# Patient Record
Sex: MALE | Race: BLACK OR AFRICAN AMERICAN | Employment: UNEMPLOYED | URBAN - METROPOLITAN AREA
[De-identification: names, ages, dates, MRNs, and addresses within clinical notes are randomized per-mention and may not be internally consistent; named-entity substitution may affect disease eponyms.]

---

## 2019-09-03 ENCOUNTER — HOSPITAL ENCOUNTER (INPATIENT)
Facility: HOSPITAL | Age: 39
LOS: 2 days | Discharge: LEFT BEFORE DISCHARGE | DRG: 684 | End: 2019-09-05
Attending: EMERGENCY MEDICINE | Admitting: HOSPITALIST
Payer: MEDICAID

## 2019-09-03 DIAGNOSIS — E86.0 DEHYDRATION: ICD-10-CM

## 2019-09-03 DIAGNOSIS — D64.9 ANEMIA, UNSPECIFIED TYPE: ICD-10-CM

## 2019-09-03 DIAGNOSIS — R55 SYNCOPE, NEAR: ICD-10-CM

## 2019-09-03 DIAGNOSIS — N17.9 ACUTE RENAL FAILURE, UNSPECIFIED ACUTE RENAL FAILURE TYPE (HCC): Primary | ICD-10-CM

## 2019-09-03 LAB
ANION GAP SERPL CALC-SCNC: 12 MMOL/L (ref 0–18)
BASOPHILS # BLD AUTO: 0.02 X10(3) UL (ref 0–0.2)
BASOPHILS NFR BLD AUTO: 0.5 %
BILIRUB UR QL: NEGATIVE
BUN BLD-MCNC: 61 MG/DL (ref 7–18)
BUN/CREAT SERPL: 14.1 (ref 10–20)
CALCIUM BLD-MCNC: 10.1 MG/DL (ref 8.5–10.1)
CHLORIDE SERPL-SCNC: 106 MMOL/L (ref 98–112)
CO2 SERPL-SCNC: 22 MMOL/L (ref 21–32)
COLOR UR: YELLOW
CREAT BLD-MCNC: 4.33 MG/DL (ref 0.7–1.3)
DEPRECATED RDW RBC AUTO: 39.8 FL (ref 35.1–46.3)
EOSINOPHIL # BLD AUTO: 0.06 X10(3) UL (ref 0–0.7)
EOSINOPHIL NFR BLD AUTO: 1.4 %
ERYTHROCYTE [DISTWIDTH] IN BLOOD BY AUTOMATED COUNT: 13.9 % (ref 11–15)
GLUCOSE BLD-MCNC: 114 MG/DL (ref 70–99)
GLUCOSE BLDC GLUCOMTR-MCNC: 63 MG/DL (ref 70–99)
GLUCOSE BLDC GLUCOMTR-MCNC: 88 MG/DL (ref 70–99)
GLUCOSE BLDC GLUCOMTR-MCNC: 88 MG/DL (ref 70–99)
GLUCOSE UR-MCNC: NEGATIVE MG/DL
HCT VFR BLD AUTO: 32.1 % (ref 39–53)
HGB BLD-MCNC: 10.7 G/DL (ref 13–17.5)
HGB UR QL STRIP.AUTO: NEGATIVE
HYALINE CASTS #/AREA URNS AUTO: 6 /LPF
IMM GRANULOCYTES # BLD AUTO: 0.02 X10(3) UL (ref 0–1)
IMM GRANULOCYTES NFR BLD: 0.5 %
KETONES UR-MCNC: NEGATIVE MG/DL
LEUKOCYTE ESTERASE UR QL STRIP.AUTO: NEGATIVE
LYMPHOCYTES # BLD AUTO: 1.02 X10(3) UL (ref 1–4)
LYMPHOCYTES NFR BLD AUTO: 23.4 %
MCH RBC QN AUTO: 26.4 PG (ref 26–34)
MCHC RBC AUTO-ENTMCNC: 33.3 G/DL (ref 31–37)
MCV RBC AUTO: 79.1 FL (ref 80–100)
MONOCYTES # BLD AUTO: 0.24 X10(3) UL (ref 0.1–1)
MONOCYTES NFR BLD AUTO: 5.5 %
NEUTROPHILS # BLD AUTO: 3 X10 (3) UL (ref 1.5–7.7)
NEUTROPHILS # BLD AUTO: 3 X10(3) UL (ref 1.5–7.7)
NEUTROPHILS NFR BLD AUTO: 68.7 %
NITRITE UR QL STRIP.AUTO: NEGATIVE
OSMOLALITY SERPL CALC.SUM OF ELEC: 308 MOSM/KG (ref 275–295)
PH UR: 5 [PH] (ref 5–8)
PLATELET # BLD AUTO: 216 10(3)UL (ref 150–450)
POTASSIUM SERPL-SCNC: 4.7 MMOL/L (ref 3.5–5.1)
PROT UR-MCNC: NEGATIVE MG/DL
RBC # BLD AUTO: 4.06 X10(6)UL (ref 4.3–5.7)
RBC #/AREA URNS AUTO: 2 /HPF
SODIUM SERPL-SCNC: 140 MMOL/L (ref 136–145)
SP GR UR STRIP: 1.02 (ref 1–1.03)
UROBILINOGEN UR STRIP-ACNC: <2
VIT C UR-MCNC: NEGATIVE MG/DL
WBC # BLD AUTO: 4.4 X10(3) UL (ref 4–11)
WBC #/AREA URNS AUTO: 1 /HPF

## 2019-09-03 PROCEDURE — 83036 HEMOGLOBIN GLYCOSYLATED A1C: CPT | Performed by: HOSPITALIST

## 2019-09-03 PROCEDURE — 84300 ASSAY OF URINE SODIUM: CPT | Performed by: HOSPITALIST

## 2019-09-03 PROCEDURE — 85025 COMPLETE CBC W/AUTO DIFF WBC: CPT | Performed by: EMERGENCY MEDICINE

## 2019-09-03 PROCEDURE — 99285 EMERGENCY DEPT VISIT HI MDM: CPT

## 2019-09-03 PROCEDURE — 96361 HYDRATE IV INFUSION ADD-ON: CPT

## 2019-09-03 PROCEDURE — 81003 URINALYSIS AUTO W/O SCOPE: CPT | Performed by: EMERGENCY MEDICINE

## 2019-09-03 PROCEDURE — 96360 HYDRATION IV INFUSION INIT: CPT

## 2019-09-03 PROCEDURE — 80048 BASIC METABOLIC PNL TOTAL CA: CPT | Performed by: EMERGENCY MEDICINE

## 2019-09-03 PROCEDURE — 93005 ELECTROCARDIOGRAM TRACING: CPT

## 2019-09-03 PROCEDURE — 93010 ELECTROCARDIOGRAM REPORT: CPT | Performed by: EMERGENCY MEDICINE

## 2019-09-03 PROCEDURE — 82962 GLUCOSE BLOOD TEST: CPT

## 2019-09-03 PROCEDURE — 84156 ASSAY OF PROTEIN URINE: CPT | Performed by: HOSPITALIST

## 2019-09-03 PROCEDURE — 82570 ASSAY OF URINE CREATININE: CPT | Performed by: HOSPITALIST

## 2019-09-03 PROCEDURE — 84540 ASSAY OF URINE/UREA-N: CPT | Performed by: INTERNAL MEDICINE

## 2019-09-03 RX ORDER — ACETAMINOPHEN 325 MG/1
650 TABLET ORAL EVERY 6 HOURS PRN
Status: DISCONTINUED | OUTPATIENT
Start: 2019-09-03 | End: 2019-09-05

## 2019-09-03 RX ORDER — SODIUM CHLORIDE 9 MG/ML
INJECTION, SOLUTION INTRAVENOUS CONTINUOUS
Status: DISCONTINUED | OUTPATIENT
Start: 2019-09-03 | End: 2019-09-05

## 2019-09-03 RX ORDER — SODIUM CHLORIDE 9 MG/ML
INJECTION, SOLUTION INTRAVENOUS CONTINUOUS
Status: DISCONTINUED | OUTPATIENT
Start: 2019-09-03 | End: 2019-09-04

## 2019-09-03 RX ORDER — DEXTROSE MONOHYDRATE 25 G/50ML
50 INJECTION, SOLUTION INTRAVENOUS AS NEEDED
Status: DISCONTINUED | OUTPATIENT
Start: 2019-09-03 | End: 2019-09-05

## 2019-09-03 RX ORDER — SODIUM CHLORIDE 9 MG/ML
1000 INJECTION, SOLUTION INTRAVENOUS ONCE
Status: COMPLETED | OUTPATIENT
Start: 2019-09-03 | End: 2019-09-03

## 2019-09-03 RX ORDER — ONDANSETRON 2 MG/ML
4 INJECTION INTRAMUSCULAR; INTRAVENOUS EVERY 6 HOURS PRN
Status: DISCONTINUED | OUTPATIENT
Start: 2019-09-03 | End: 2019-09-05

## 2019-09-03 RX ORDER — SODIUM CHLORIDE 0.9 % (FLUSH) 0.9 %
3 SYRINGE (ML) INJECTION AS NEEDED
Status: DISCONTINUED | OUTPATIENT
Start: 2019-09-03 | End: 2019-09-05

## 2019-09-03 RX ORDER — METOCLOPRAMIDE HYDROCHLORIDE 5 MG/ML
5 INJECTION INTRAMUSCULAR; INTRAVENOUS EVERY 8 HOURS PRN
Status: DISCONTINUED | OUTPATIENT
Start: 2019-09-03 | End: 2019-09-05

## 2019-09-03 RX ORDER — HEPARIN SODIUM 5000 [USP'U]/ML
5000 INJECTION, SOLUTION INTRAVENOUS; SUBCUTANEOUS EVERY 12 HOURS SCHEDULED
Status: DISCONTINUED | OUTPATIENT
Start: 2019-09-04 | End: 2019-09-05

## 2019-09-03 NOTE — ED INITIAL ASSESSMENT (HPI)
Pt reports while on his way to a job interview today he became dizzy and fell over. C/o mild headache. Denies recent illness.

## 2019-09-03 NOTE — ED PROVIDER NOTES
Patient Seen in: [facility] AND [facility] Department    History   Patient presents with:  Dizziness (neurologic)    Stated Complaint: dizziness    HPI    Patient is here with complaint of just not feeling well.   He has currently been unemployed and ha noted in HPI. Constitutional and vital signs reviewed. All other systems reviewed and negative except as noted above.       Physical Exam     ED Triage Vitals [09/03/19 1559]   BP 91/48   Pulse 99   Resp 18   Temp 98.1 °F (36.7 °C)   Temp src    SpO2 Notable for the following components:       Result Value    Glucose 114 (*)     BUN 61 (*)     Creatinine 4.33 (*)     Calculated Osmolality 308 (*)     GFR, Non- 16 (*)     GFR, -American 19 (*)     All other components within stone days.      Medications Prescribed:  There are no discharge medications for this patient.       Present on Admission           ICD-10-CM Noted POA    [facility] N17.9 9/3/2019 Unknown

## 2019-09-04 ENCOUNTER — APPOINTMENT (OUTPATIENT)
Dept: ULTRASOUND IMAGING | Facility: HOSPITAL | Age: 39
DRG: 684 | End: 2019-09-04
Attending: INTERNAL MEDICINE
Payer: MEDICAID

## 2019-09-04 LAB
ALBUMIN SERPL-MCNC: 4.2 G/DL (ref 3.4–5)
ANION GAP SERPL CALC-SCNC: 6 MMOL/L (ref 0–18)
BASOPHILS # BLD AUTO: 0.02 X10(3) UL (ref 0–0.2)
BASOPHILS NFR BLD AUTO: 0.4 %
BUN BLD-MCNC: 52 MG/DL (ref 7–18)
BUN/CREAT SERPL: 15.1 (ref 10–20)
CALCIUM BLD-MCNC: 9.2 MG/DL (ref 8.5–10.1)
CHLORIDE SERPL-SCNC: 110 MMOL/L (ref 98–112)
CO2 SERPL-SCNC: 25 MMOL/L (ref 21–32)
CREAT BLD-MCNC: 3.45 MG/DL (ref 0.7–1.3)
CREAT UR-SCNC: 188 MG/DL
DEPRECATED RDW RBC AUTO: 40.1 FL (ref 35.1–46.3)
EOSINOPHIL # BLD AUTO: 0.12 X10(3) UL (ref 0–0.7)
EOSINOPHIL NFR BLD AUTO: 2.3 %
ERYTHROCYTE [DISTWIDTH] IN BLOOD BY AUTOMATED COUNT: 14 % (ref 11–15)
EST. AVERAGE GLUCOSE BLD GHB EST-MCNC: 97 MG/DL (ref 68–126)
EST. AVERAGE GLUCOSE BLD GHB EST-MCNC: 97 MG/DL (ref 68–126)
GLUCOSE BLD-MCNC: 77 MG/DL (ref 70–99)
GLUCOSE BLDC GLUCOMTR-MCNC: 102 MG/DL (ref 70–99)
GLUCOSE BLDC GLUCOMTR-MCNC: 77 MG/DL (ref 70–99)
GLUCOSE BLDC GLUCOMTR-MCNC: 83 MG/DL (ref 70–99)
GLUCOSE BLDC GLUCOMTR-MCNC: 84 MG/DL (ref 70–99)
GLUCOSE BLDC GLUCOMTR-MCNC: 95 MG/DL (ref 70–99)
HAV IGM SER QL: 2.1 MG/DL (ref 1.6–2.6)
HBA1C MFR BLD HPLC: 5 % (ref ?–5.7)
HBA1C MFR BLD HPLC: 5 % (ref ?–5.7)
HCT VFR BLD AUTO: 29.5 % (ref 39–53)
HGB BLD-MCNC: 10 G/DL (ref 13–17.5)
IMM GRANULOCYTES # BLD AUTO: 0.01 X10(3) UL (ref 0–1)
IMM GRANULOCYTES NFR BLD: 0.2 %
LYMPHOCYTES # BLD AUTO: 1.9 X10(3) UL (ref 1–4)
LYMPHOCYTES NFR BLD AUTO: 37.2 %
MCH RBC QN AUTO: 27 PG (ref 26–34)
MCHC RBC AUTO-ENTMCNC: 33.9 G/DL (ref 31–37)
MCV RBC AUTO: 79.5 FL (ref 80–100)
MONOCYTES # BLD AUTO: 0.44 X10(3) UL (ref 0.1–1)
MONOCYTES NFR BLD AUTO: 8.6 %
NEUTROPHILS # BLD AUTO: 2.62 X10 (3) UL (ref 1.5–7.7)
NEUTROPHILS # BLD AUTO: 2.62 X10(3) UL (ref 1.5–7.7)
NEUTROPHILS NFR BLD AUTO: 51.3 %
OSMOLALITY SERPL CALC.SUM OF ELEC: 305 MOSM/KG (ref 275–295)
PHOSPHATE SERPL-MCNC: 3.5 MG/DL (ref 2.5–4.9)
PLATELET # BLD AUTO: 195 10(3)UL (ref 150–450)
POTASSIUM SERPL-SCNC: 4.5 MMOL/L (ref 3.5–5.1)
PROT UR-MCNC: 18.4 MG/DL
RBC # BLD AUTO: 3.71 X10(6)UL (ref 4.3–5.7)
SODIUM SERPL-SCNC: 141 MMOL/L (ref 136–145)
SODIUM SERPL-SCNC: 46 MMOL/L
UUN UR-MCNC: 514 MG/DL
WBC # BLD AUTO: 5.1 X10(3) UL (ref 4–11)

## 2019-09-04 PROCEDURE — 82962 GLUCOSE BLOOD TEST: CPT

## 2019-09-04 PROCEDURE — 83735 ASSAY OF MAGNESIUM: CPT | Performed by: INTERNAL MEDICINE

## 2019-09-04 PROCEDURE — 80069 RENAL FUNCTION PANEL: CPT | Performed by: INTERNAL MEDICINE

## 2019-09-04 PROCEDURE — 76770 US EXAM ABDO BACK WALL COMP: CPT | Performed by: INTERNAL MEDICINE

## 2019-09-04 PROCEDURE — 83036 HEMOGLOBIN GLYCOSYLATED A1C: CPT | Performed by: HOSPITALIST

## 2019-09-04 PROCEDURE — 85025 COMPLETE CBC W/AUTO DIFF WBC: CPT | Performed by: HOSPITALIST

## 2019-09-04 RX ORDER — TERAZOSIN 2 MG/1
2 CAPSULE ORAL NIGHTLY
COMMUNITY

## 2019-09-04 RX ORDER — TERAZOSIN 2 MG/1
2 CAPSULE ORAL NIGHTLY
Status: DISCONTINUED | OUTPATIENT
Start: 2019-09-04 | End: 2019-09-05

## 2019-09-04 RX ORDER — FLUTICASONE PROPIONATE 50 MCG
2 SPRAY, SUSPENSION (ML) NASAL DAILY
COMMUNITY

## 2019-09-04 RX ORDER — BACLOFEN 10 MG/1
10 TABLET ORAL EVERY 8 HOURS PRN
COMMUNITY

## 2019-09-04 RX ORDER — GLIPIZIDE 5 MG/1
5 TABLET, FILM COATED, EXTENDED RELEASE ORAL DAILY
COMMUNITY

## 2019-09-04 RX ORDER — AMLODIPINE BESYLATE 10 MG/1
10 TABLET ORAL DAILY
COMMUNITY

## 2019-09-04 RX ORDER — DEXTROSE MONOHYDRATE 25 G/50ML
50 INJECTION, SOLUTION INTRAVENOUS AS NEEDED
Status: DISCONTINUED | OUTPATIENT
Start: 2019-09-04 | End: 2019-09-04

## 2019-09-04 RX ORDER — HYDROCHLOROTHIAZIDE 25 MG/1
25 TABLET ORAL DAILY
COMMUNITY

## 2019-09-04 RX ORDER — DULOXETIN HYDROCHLORIDE 30 MG/1
30 CAPSULE, DELAYED RELEASE ORAL DAILY
COMMUNITY

## 2019-09-04 NOTE — CONSULTS
NEPHROLOGY CONSULT NOTE     DATE: 9/4/2019    Requesting Physician: Dr. Frantz Pedraza     Reason for Consult: ED consult      HISTORY OF PRESENT ILLNESS: Marisol Faria is a 42year old male with history of DM, HTN, HLD.   Presented to the ED with complaints of feel connections:        Talks on phone: Not on file        Gets together: Not on file        Attends Bahai service: Not on file        Active member of club or organization: Not on file        Attends meetings of clubs or organizations: Not on file amLODIPine Besylate 10 MG Oral Tab Take 10 mg by mouth daily. Fluticasone Propionate 50 MCG/ACT Nasal Suspension 2 sprays by Nasal route daily. aspirin 81 MG Oral Tab Take 81 mg by mouth daily.        ALLERGIES:   Shellfish-Derived P*    UNKNOWN    RE Urine lytes pre-renal  - UA  Negative for blood and protein, hyaline casts noted   - renal US: negative for hydronephrosis. Mild increase in echogenicity in both kidneys.   Simple cyst noted upper pole of right kidney   - check orthostatics, bladder scan po

## 2019-09-04 NOTE — PROGRESS NOTES
[facility] Pharmacy Note:  Renal Dose Adjustment for Metoclopramide (REGLAN)    Iftikhar Jeffery has been prescribed Metoclopramide (REGLAN) 10 mg every 8 hours as needed for Nausea, vomiting.     Estimated Creatinine Clearance: 27.9 mL/min (A) (based on SCr of 4.33

## 2019-09-04 NOTE — DIABETES ED
[facility] HOSP - [facility]    Diabetes Education  Note    Camila Reilly Patient Status:  Inpatient   1980 MRN [id number]  Location [facility] Attending Rhonda Bolanos MD  Hosp Day # 1 PCP Elmer Vazquez    Reason for Visit: No blood sugar leslie

## 2019-09-04 NOTE — H&P
[facility] Hospitalist H&P       CC: Patient presents with:  Dizziness (neurologic)       PCP: Renée Orr    History of Present Illness: 43 y/o m w hx of dm, htn on lisinopril,  presents after feeling dizzy, fell to his knees while attempting to walk to job in SpO2 98%   BMI 29.59 kg/m²   General:  Alert, no distress   Head:  Normocephalic, without obvious abnormality, atraumatic. Eyes:  Sclera anicteric, No conjunctival pallor, EOMs intact. Nose: Nares normal. Septum midline. Mucosa normal. No drainage. to follow patient while in house    Patient and/or patient's family given opportunity to ask questions and note understanding and agreeing with therapeutic plan as outlined    Thank You,  Speedy Cam MD    [facility] Hospitalist  Answering Service number: [phone]

## 2019-09-04 NOTE — ED NOTES
Orders for admission, patient is aware of plan and ready to go upstairs. Any questions, please call ED RN Tika Nj at extension [phone].

## 2019-09-04 NOTE — PLAN OF CARE
Problem: Patient Centered Care  Goal: Patient preferences are identified and integrated in the patient's plan of care  Description  Interventions:  - What would you like us to know as we care for you? Financially unstable due to unemployment.  He stated t pt/family on patient safety including physical limitations  - Instruct pt to call for assistance with activity based on assessment  - Modify environment to reduce risk of injury  - Provide assistive devices as appropriate  - Consider OT/PT consult to ryan

## 2019-09-05 VITALS
TEMPERATURE: 98 F | OXYGEN SATURATION: 100 % | BODY MASS INDEX: 29.43 KG/M2 | DIASTOLIC BLOOD PRESSURE: 78 MMHG | HEART RATE: 80 BPM | WEIGHT: 236.69 LBS | RESPIRATION RATE: 14 BRPM | SYSTOLIC BLOOD PRESSURE: 126 MMHG | HEIGHT: 75 IN

## 2019-09-05 LAB
ALBUMIN SERPL-MCNC: 4 G/DL (ref 3.4–5)
ANION GAP SERPL CALC-SCNC: 6 MMOL/L (ref 0–18)
BUN BLD-MCNC: 33 MG/DL (ref 7–18)
BUN/CREAT SERPL: 14.2 (ref 10–20)
CALCIUM BLD-MCNC: 9 MG/DL (ref 8.5–10.1)
CHLORIDE SERPL-SCNC: 108 MMOL/L (ref 98–112)
CO2 SERPL-SCNC: 25 MMOL/L (ref 21–32)
CREAT BLD-MCNC: 2.33 MG/DL (ref 0.7–1.3)
DEPRECATED HBV CORE AB SER IA-ACNC: 231.9 NG/ML (ref 48–420)
GLUCOSE BLD-MCNC: 85 MG/DL (ref 70–99)
GLUCOSE BLDC GLUCOMTR-MCNC: 98 MG/DL (ref 70–99)
HAV IGM SER QL: 1.8 MG/DL (ref 1.6–2.6)
IRON SATURATION: 29 % (ref 20–50)
IRON SERPL-MCNC: 89 UG/DL (ref 65–175)
OSMOLALITY SERPL CALC.SUM OF ELEC: 295 MOSM/KG (ref 275–295)
PHOSPHATE SERPL-MCNC: 3.1 MG/DL (ref 2.5–4.9)
POTASSIUM SERPL-SCNC: 4.6 MMOL/L (ref 3.5–5.1)
SODIUM SERPL-SCNC: 139 MMOL/L (ref 136–145)
TOTAL IRON BINDING CAPACITY: 307 UG/DL (ref 240–450)
TRANSFERRIN SERPL-MCNC: 206 MG/DL (ref 200–360)

## 2019-09-05 PROCEDURE — 83735 ASSAY OF MAGNESIUM: CPT | Performed by: INTERNAL MEDICINE

## 2019-09-05 PROCEDURE — 83540 ASSAY OF IRON: CPT | Performed by: INTERNAL MEDICINE

## 2019-09-05 PROCEDURE — 82728 ASSAY OF FERRITIN: CPT | Performed by: INTERNAL MEDICINE

## 2019-09-05 PROCEDURE — 84466 ASSAY OF TRANSFERRIN: CPT | Performed by: INTERNAL MEDICINE

## 2019-09-05 PROCEDURE — 82962 GLUCOSE BLOOD TEST: CPT

## 2019-09-05 PROCEDURE — 80069 RENAL FUNCTION PANEL: CPT | Performed by: INTERNAL MEDICINE

## 2019-09-05 NOTE — DISCHARGE SUMMARY
General Medicine Discharge Summary     Patient ID:  Sly Sanchez  42year old  1/11/1980      THIS IS AN AMA NOTE      Admit date: 9/3/2019    Discharge date and time: 9/5/19    Attending Physician: Hany Curiel MD     Consults: IP CONSULT TO HOSPITALIST Tb24  Commonly known as:  GLIPIZIDE XL     hydrochlorothiazide 25 MG Tabs  Commonly known as:  HYDRODIURIL     metFORMIN HCl 1000 MG Tabs  Commonly known as:  GLUCOPHAGE     Terazosin HCl 2 MG Caps  Commonly known as:  HYTRIN            FU  Follow-up Infor

## 2019-09-05 NOTE — PROGRESS NOTES
[facility] Hospitalist Progress Note     CC: Hospital Follow up    PCP: Kamar Bunch       Assessment/Plan:     Principal Problem:    Acute renal failure, unspecified acute renal failure type [facility])  Active Problems:    Acute renal failure ([address]    Syncope, near Affect- normal  SKIN: warm, dry  EXT: no edema      Data Review:       Labs:     Recent Labs   Lab 09/03/19  1621 09/04/19  0702   RBC 4.06* 3.71*   HGB 10.7* 10.0*   HCT 32.1* 29.5*   MCV 79.1* 79.5*   MCH 26.4 27.0   MCHC 33.3 33.9   RDW 13.9 14.0   NEPR

## 2019-09-05 NOTE — PLAN OF CARE
Patient denies dizziness or feeling lightheaded at rest or during ambulation  Blood sugar monitored as ordered  No insulin coverage needed during shift      Problem: Patient Centered Care  Goal: Patient preferences are identified and integrated in the luis antonio Identify cognitive and physical deficits and behaviors that affect risk of falls.   - [place] fall precautions as indicated by assessment.  - Educate pt/family on patient safety including physical limitations  - Instruct pt to call for assistance with act

## 2019-09-05 NOTE — PAYOR COMM NOTE
--------------  ADMISSION REVIEW     Taina Bell #:  [id number]  Authorization Number: [id number]    Admit date: 9/3/19  Admit time: 2343       Admitting Physician: Meggan Calvillo MD  Attending Physician:  No att. providers found  Primary Car hypertension    • Hyperlipidemia        History reviewed. No pertinent surgical history.     Medications :  Not on File    Review of Systems  Constitutional: no fever  HEENT: No cough, no congestion  Cardiovascular: no chest pain  Respiratory: no shortness states he thinks he had blood tests a couple of months ago but he thinks they were normal he is never heard that before. We did add on urinalysis I recommended admission I have paged [facility] hospitalist and nephrologist as well.   I did discuss with Dr. Tamela Reed :  Agustin Morin MD (Physician)           Ovidio Perdue [facility]       CC: Patient presents with:  Dizziness (neurologic)       PCP: Nicolette Lin    History of Present Illness: 43 y/o m w hx of dm, htn on lisinopril,  presents after feeling dizzy, fell to adenopathy, thyroid: no enlargment/tenderness/nodules appreciated   Lungs:   Clear to auscultation bilaterally. Normal effort   Chest wall:  No tenderness or deformity.    Heart:  Regular rate and rhythm, S1, S2 normal, no murmur, rub or gallop appreciated hydronephrosis. Mild increase in echogenicity in both kidneys.   Simple cyst noted upper pole of right kidney   - check orthostatics, bladder scan post void to assess PVR   - Creatinine is down trending, continue IVFs   - follow renal fxn and I/Os   - renal 09/05/19 1248     0157-Given   2009-Given              Medications 09/03/19 09/04/19             Medications 09/03/19 09/04/19   0.9% NaCl infusion   Rate: 125 mL/hr Freq: Continuous Route: IV  Last Dose: Stopped (09/04/19 1800)  Start: 09/03/19 2345 End:

## 2019-09-06 NOTE — PAYOR COMM NOTE
--------------  DISCHARGE REVIEW    Fernanda Nicolas #:  [id number]  Authorization Number: [id number]    Admit date: 9/3/19  Admit time:  2343  Discharge Date: 9/5/2019 10:47 AM     Admitting Physician: Eduard Currie MD  Attending Physician:  Ericka holding ace/hctz until can have kidney function rechecked at pcp office, told to f/u at pcp office asap        Operative Procedures:      Imaging:         Disposition: home    Activity: activity as tolerated  Diet: renal diet    Code Status: Full Code